# Patient Record
Sex: MALE | Race: WHITE | RURAL
[De-identification: names, ages, dates, MRNs, and addresses within clinical notes are randomized per-mention and may not be internally consistent; named-entity substitution may affect disease eponyms.]

---

## 2019-11-26 ENCOUNTER — OFFICE VISIT (OUTPATIENT)
Dept: FAMILY MEDICINE CLINIC | Age: 29
End: 2019-11-26

## 2019-11-26 ENCOUNTER — HOSPITAL ENCOUNTER (OUTPATIENT)
Dept: LAB | Age: 29
Discharge: HOME | End: 2019-11-26

## 2019-11-26 VITALS
OXYGEN SATURATION: 98 % | WEIGHT: 272 LBS | RESPIRATION RATE: 16 BRPM | DIASTOLIC BLOOD PRESSURE: 84 MMHG | HEIGHT: 72 IN | TEMPERATURE: 98.8 F | HEART RATE: 73 BPM | BODY MASS INDEX: 36.84 KG/M2 | SYSTOLIC BLOOD PRESSURE: 139 MMHG

## 2019-11-26 DIAGNOSIS — R06.83 SNORING: ICD-10-CM

## 2019-11-26 DIAGNOSIS — M10.9 GOUT, UNSPECIFIED CAUSE, UNSPECIFIED CHRONICITY, UNSPECIFIED SITE: ICD-10-CM

## 2019-11-26 DIAGNOSIS — M25.511 ACUTE PAIN OF RIGHT SHOULDER: ICD-10-CM

## 2019-11-26 DIAGNOSIS — E66.9 OBESITY (BMI 30-39.9): ICD-10-CM

## 2019-11-26 DIAGNOSIS — R03.0 ELEVATED BLOOD PRESSURE READING: ICD-10-CM

## 2019-11-26 DIAGNOSIS — Z76.89 ENCOUNTER TO ESTABLISH CARE: ICD-10-CM

## 2019-11-26 DIAGNOSIS — H92.01 RIGHT EAR PAIN: Primary | ICD-10-CM

## 2019-11-26 DIAGNOSIS — J30.89 ENVIRONMENTAL AND SEASONAL ALLERGIES: ICD-10-CM

## 2019-11-26 DIAGNOSIS — H65.191 ACUTE EFFUSION OF RIGHT EAR: ICD-10-CM

## 2019-11-26 LAB
ALBUMIN SERPL-MCNC: 4 G/DL (ref 3.5–5)
ALBUMIN/GLOB SERPL: 1.1 {RATIO} (ref 1.1–2.2)
ALP SERPL-CCNC: 76 U/L (ref 45–117)
ALT SERPL-CCNC: 38 U/L (ref 12–78)
ANION GAP SERPL CALC-SCNC: 5 MMOL/L (ref 5–15)
AST SERPL-CCNC: 20 U/L (ref 15–37)
BILIRUB SERPL-MCNC: 0.4 MG/DL (ref 0.2–1)
BUN SERPL-MCNC: 12 MG/DL (ref 6–20)
BUN/CREAT SERPL: 13 (ref 12–20)
CALCIUM SERPL-MCNC: 9.2 MG/DL (ref 8.5–10.1)
CHLORIDE SERPL-SCNC: 105 MMOL/L (ref 97–108)
CHOLEST SERPL-MCNC: 181 MG/DL
CO2 SERPL-SCNC: 28 MMOL/L (ref 21–32)
CREAT SERPL-MCNC: 0.9 MG/DL (ref 0.7–1.3)
GLOBULIN SER CALC-MCNC: 3.5 G/DL (ref 2–4)
GLUCOSE SERPL-MCNC: 101 MG/DL (ref 65–100)
HDLC SERPL-MCNC: 31 MG/DL
HDLC SERPL: 5.8 {RATIO} (ref 0–5)
LDLC SERPL CALC-MCNC: 121.2 MG/DL (ref 0–100)
LIPID PROFILE,FLP: ABNORMAL
POTASSIUM SERPL-SCNC: 4 MMOL/L (ref 3.5–5.1)
PROT SERPL-MCNC: 7.5 G/DL (ref 6.4–8.2)
SODIUM SERPL-SCNC: 138 MMOL/L (ref 136–145)
TRIGL SERPL-MCNC: 144 MG/DL (ref ?–150)
VLDLC SERPL CALC-MCNC: 28.8 MG/DL

## 2019-11-26 RX ORDER — MINERAL OIL
ENEMA (ML) RECTAL
COMMUNITY
End: 2019-12-12

## 2019-11-26 RX ORDER — FLUTICASONE PROPIONATE 50 MCG
2 SPRAY, SUSPENSION (ML) NASAL DAILY
COMMUNITY
End: 2019-12-12

## 2019-11-26 RX ORDER — IBUPROFEN 200 MG
TABLET ORAL
COMMUNITY

## 2019-11-26 NOTE — PATIENT INSTRUCTIONS
Neck Arthritis: Exercises Introduction Here are some examples of exercises for you to try. The exercises may be suggested for a condition or for rehabilitation. Start each exercise slowly. Ease off the exercises if you start to have pain. You will be told when to start these exercises and which ones will work best for you. How to do the exercises Neck stretches to the side 1. This stretch works best if you keep your shoulder down as you lean away from it. To help you remember to do this, start by relaxing your shoulders and lightly holding on to your thighs or your chair. 2. Tilt your head toward your shoulder and hold for 15 to 30 seconds. Let the weight of your head stretch your muscles. 3. Repeat 2 to 4 times toward each shoulder. Chin tuck 1. Lie on the floor with a rolled-up towel under your neck. Your head should be touching the floor. 2. Slowly bring your chin toward your chest. 
3. Hold for a count of 6, and then relax for up to 10 seconds. 4. Repeat 8 to 12 times. Active cervical rotation 1. Sit in a firm chair, or stand up straight. 2. Keeping your chin level, turn your head to the right, and hold for 15 to 30 seconds. 3. Turn your head to the left and hold for 15 to 30 seconds. 4. Repeat 2 to 4 times to each side. Shoulder blade squeeze 1. While standing, squeeze your shoulder blades together. 2. Do not raise your shoulders up as you are squeezing. 3. Hold for 6 seconds. 4. Repeat 8 to 12 times. Shoulder rolls 1. Sit comfortably with your feet shoulder-width apart. You can also do this exercise standing up. 2. Roll your shoulders up, then back, and then down in a smooth, circular motion. 3. Repeat 2 to 4 times. Follow-up care is a key part of your treatment and safety. Be sure to make and go to all appointments, and call your doctor if you are having problems. It's also a good idea to know your test results and keep a list of the medicines you take. Where can you learn more? Go to http://alejandra-jac.info/. Enter V139 in the search box to learn more about \"Neck Arthritis: Exercises. \" Current as of: June 26, 2019 Content Version: 12.2 © 9682-8377 [employer]. Care instructions adapted under license by [employer] (which disclaims liability or warranty for this information). If you have questions about a medical condition or this instruction, always ask your healthcare professional. [address] any warranty or liability for your use of this information. Pinched Nerve in the Neck: Care Instructions Your Care Instructions A pinched nerve in the neck happens when a vertebra or disc in the upper part of your spine is damaged. This damage can happen because of an injury. Or it can just happen with age. The changes caused by the damage may put pressure on a nearby nerve root, pinching it. This causes symptoms such as sharp pain in your neck, shoulder, arm, hand, or back. You may also have tingling or numbness. Sometimes it makes your arm weaker. The symptoms are usually worse when you turn your head or strain your neck. For many people, the symptoms get better over time and finally go away. Early treatment usually includes medicines for pain and swelling. Sometimes physical therapy and special exercises may help. Follow-up care is a key part of your treatment and safety. Be sure to make and go to all appointments, and call your doctor if you are having problems. It's also a good idea to know your test results and keep a list of the medicines you take. How can you care for yourself at home? · Be safe with medicines. Read and follow all instructions on the label. ? If the doctor gave you a prescription medicine for pain, take it as prescribed. ? If you are not taking a prescription pain medicine, ask your doctor if you can take an over-the-counter medicine. · Try using a heating pad on a low or medium setting for 15 to 20 minutes every 2 or 3 hours. Try a warm shower in place of one session with the heating pad. You can also buy single-use heat wraps that last up to 8 hours. · You can also try an ice pack for 10 to 15 minutes every 2 to 3 hours. There isn't strong evidence that either heat or ice will help. But you can try them to see if they help you. · Don't spend too long in one position. Take short breaks to move around and change positions. · Wear a seat belt and shoulder harness when you are in a car. · Sleep with a pillow under your head and neck that keeps your neck straight. · If you were given a neck brace (cervical collar) to limit neck motion, wear it as instructed for as many days as your doctor tells you to. Do not wear it longer than you were told to. Wearing a brace for too long can lead to neck stiffness and can weaken the neck muscles. · Follow your doctor's instructions for gentle neck-stretching exercises. · Do not smoke. Smoking can slow healing of your discs. If you need help quitting, talk to your doctor about stop-smoking programs and medicines. These can increase your chances of quitting for good. · Avoid strenuous work or exercise until your doctor says it is okay. When should you call for help? Call 911 anytime you think you may need emergency care. For example, call if: 
  · You are unable to move an arm or a leg at all.  
[facility] your doctor now or seek immediate medical care if: 
  · You have new or worse symptoms in your arms, legs, chest, belly, or buttocks. Symptoms may include: 
? Numbness or tingling. ? Weakness. ? Pain.  
  · You lose bladder or bowel control.  
 Watch closely for changes in your health, and be sure to contact your doctor if: 
  · You are not getting better as expected. Where can you learn more? Go to http://alejandra-jac.info/. Enter K153 in the search box to learn more about \"Pinched Nerve in the Neck: Care Instructions. \" Current as of: June 26, 2019 Content Version: 12.2 © 1563-8407 [employer]. Care instructions adapted under license by [employer] (which disclaims liability or warranty for this information). If you have questions about a medical condition or this instruction, always ask your healthcare professional. [place] [address] any warranty or liability for your use of this information.

## 2019-11-26 NOTE — PROGRESS NOTES
Progress Note    Patient: Enrique Garcia MRN: [id number]  SSN: [id number]    YOB: 1990  Age: 30 y.o. Sex: male        Chief Complaint   Patient presents with    New Patient    Ear Pain     right ear pain, jaw pain, throat pain several months    Shoulder Pain         Subjective:     Problems addressed:  Encounter Diagnoses     ICD-10-CM ICD-9-CM   1. Right ear pain H92.01 388.70   2. Acute effusion of right ear H65.191 381.00   3. Environmental and seasonal allergies J30.89 477.8   4. Acute pain of right shoulder M25.511 719.41   5. Obesity (BMI 30-39. 9) E66.9 278.00   6. Snoring R06.83 786.09   7. Gout, unspecified cause, unspecified chronicity, unspecified site M10.9 274.9   8. Encounter to establish care Z76.89 V65.8   9. Elevated blood pressure reading R03.0 796.2       31 y/o M with PMH of gout and obesity presents to establish care and R ear pain. Previously went to [facility], will request and review records, pt says [facility] is close for him. Patient complains of R ear pain, R jaw pain, and R shoulder pain. R ear pain started a few months ago, was treated for ear infection with Zithromax and amoxicillin by prior PCP, symptoms improved but still with R ear pain, nasal congestion, and feeling of fullness. Denies decreased hearing. R jaw pain occurred after \"popping\" jaw 3 weeks ago and has gotten a little better since then, but still bothers him, \"Initially I could not chew for a little bit\". No similar episode in the past. 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 Occasionally uses ibuprofen for pain but otherwise takes no medications. Denies any fever, chills, CP, SOB, Abdominal pain, N/V, diarrhea or constipation.      Pt says his grandmother was recently told she had kidney cancer, but then had testing that showed spot was not cancerous. Says that made him want to come to clinic to take care of his health. Complains of snoring at night and would like a sleep study referral.     Gout: Pt with hx of gout attacks in the past, but not currently on medications, no gout attacks in past year. Gout well controlled with diet changes (pt cut out alcohol and red meat from his diet)    Family History   Problem Relation Age of Onset    Heart Attack Father         Early 45s    Hypertension Father     COPD Mother      Social History     Tobacco Use    Smoking status: Never Smoker    Smokeless tobacco: Never Used   Substance Use Topics    Alcohol use: Not Currently    Drug use: Never         Current and past medical information:    Current Medications after this visit[de-identified]     Current Outpatient Medications   Medication Sig    fluticasone propionate (FLONASE ALLERGY RELIEF) 50 mcg/actuation nasal spray 2 Sprays by Both Nostrils route daily.  fexofenadine (ALLEGRA) 180 mg tablet Take  by mouth.  ibuprofen (MOTRIN) 200 mg tablet Take  by mouth. No current facility-administered medications for this visit. Patient Active Problem List    Diagnosis Date Noted    Obesity (BMI 30-39.9) 11/26/2019    Gout        Past Medical History:   Diagnosis Date    Gout        No Known Allergies    Past Surgical History:   Procedure Laterality Date    HX ORTHOPAEDIC           Review of Systems   Constitutional: Negative for chills and fever. HENT: Positive for congestion and ear pain (R ear). Negative for sore throat. Eyes: Negative for blurred vision and double vision. Respiratory: Negative for cough and shortness of breath. Cardiovascular: Negative for chest pain and palpitations. Gastrointestinal: Negative for abdominal pain, blood in stool, nausea and vomiting. Genitourinary: Negative for hematuria. Musculoskeletal: Positive for joint pain (R shoulder pain). Negative for falls. Skin: Negative for rash.    Neurological: Negative for dizziness and headaches. Psychiatric/Behavioral: Negative for substance abuse. Objective:     Vitals:    11/26/19 0822   BP: 139/84   Pulse: 73   Resp: 16   Temp: 98.8 °F (37.1 °C)   TempSrc: Oral   SpO2: 98%   Weight: 272 lb (123.4 kg)   Height: 6' (1.829 m)      Body mass index is 36.89 kg/m². Physical Exam  Vitals signs reviewed. Constitutional:       General: He is not in acute distress. Appearance: Normal appearance. He is obese. He is not ill-appearing, toxic-appearing or diaphoretic. HENT:      Head: Normocephalic and atraumatic. Right Ear: Ear canal and external ear normal. A middle ear effusion is present. There is no impacted cerumen. Tympanic membrane is not erythematous. Left Ear: Ear canal and external ear normal.  No middle ear effusion. There is no impacted cerumen. Tympanic membrane is not erythematous. Nose: Congestion present. Mouth/Throat:      Mouth: Mucous membranes are moist.      Pharynx: No oropharyngeal exudate. Eyes:      Extraocular Movements: Extraocular movements intact. Conjunctiva/sclera: Conjunctivae normal.      Pupils: Pupils are equal, round, and reactive to light. Neck:      Musculoskeletal: Normal range of motion and neck supple. Cardiovascular:      Rate and Rhythm: Normal rate and regular rhythm. Heart sounds: No murmur. Pulmonary:      Effort: Pulmonary effort is normal. No respiratory distress. Breath sounds: Normal breath sounds. No wheezing or rhonchi. Abdominal:      General: Bowel sounds are normal.      Palpations: Abdomen is soft. Tenderness: There is no tenderness. There is no guarding. Musculoskeletal:      Right shoulder: He exhibits normal range of motion, no swelling, no deformity and normal strength. Comments: Positive Spurling test on R side with pain shooting down to pt's shoulder   Lymphadenopathy:      Cervical: No cervical adenopathy. Skin:     General: Skin is warm and dry. Coloration: Skin is not pale. Findings: No erythema. Neurological:      General: No focal deficit present. Mental Status: He is alert. Psychiatric:      Comments: Anxious Affect          Health Maintenance Due   Topic Date Due    DTaP/Tdap/Td series (1 - Tdap) 10/14/2002    Influenza Age 5 to Adult  08/01/2019       Assessment and orders:     Encounter Diagnoses     ICD-10-CM ICD-9-CM   1. Right ear pain H92.01 388.70   2. Acute effusion of right ear H65.191 381.00   3. Environmental and seasonal allergies J30.89 477.8   4. Acute pain of right shoulder M25.511 719.41   5. Obesity (BMI 30-39. 9) E66.9 278.00   6. Snoring R06.83 786.09   7. Gout, unspecified cause, unspecified chronicity, unspecified site M10.9 274.9   8. Encounter to establish care Z76.89 V65.8   9. Elevated blood pressure reading R03.0 796.2       29 y/o M with R ear effusion, likely 2/2 to allergies, and R shoulder pain with possible cervical radiculopathy    1. Right ear pain with effusion - no ear infection, likely 2/2 to effusion from congestion  -START allegra daily  -START Flonase daily  -RTC in 2 weeks for reevaluation or sooner if symptoms do not improve or worsen    2. Environmental and seasonal allergies - likely cause of congestion  -START allegra daily  -START Flonase daily    3. Acute pain of right shoulder - possibly due to overuse at work vs cervical radiculopathy, no hx of trauma, injury, or prior surgery, no concerning signs on PE, full ROM and no loss of strength with minimal discomfort  -Pt given handout on neck exercises to try  -Continue Ibuprofen PRN and ice and/or heating pads as needed  -Will plan for xray if symptoms do not improve with conservative measures     4. Obesity (BMI 30-39.9) - Body mass index is 36.89 kg/m². - Counseled on dietary modifications (soda is a problem)  - METABOLIC PANEL, COMPREHENSIVE; Future  - LIPID PANEL;  Future  - Pt will consider doing weight loss clinic visit and let me know at next apt in 2 weeks    5. Snoring - pt reports he snores a lot at night (per his wife) and he feels fatigued throughout the day  - SLEEP MEDICINE REFERRAL    6. Gout, unspecified cause, unspecified chronicity, unspecified site - well controlled with dietary modifications, not on any medications for gout  -Will continue to monitor    7. Encounter to establish care  - Will request and review prior PCP records   - METABOLIC PANEL, COMPREHENSIVE; Future  - LIPID PANEL; Future    8. Elevated BP - pt denies any history of HTN  BP Readings from Last 3 Encounters:   11/26/19 139/84   -Will continue to monitor  -Encouraging pt to lose weight       Plan of care:  Discussed diagnoses in detail with patient. Medication risks/benefits/side effects discussed with patient. All of the patient's questions were addressed. The patient understands and agrees with our plan of care. The patient knows to call back if they are unsure of or forget any changes we discussed today or if the symptoms change. The patient received an After-Visit Summary which contains VS, orders, medication list and allergy list. This can be used as a \"mini-medical record\" should they have to seek medical care while out of town. Follow-up and Dispositions    · Return in about 2 weeks (around 12/10/2019), or if symptoms worsen or fail to improve, for Lab Results, ear pain, shoulder pain.          Future Appointments   Date Time Provider Alexander Martinez   12/12/2019 10:40 AM Imelda Reyes MD [facility] SCHED       Signed By: Jyothi Huston MD     November 26, 2019

## 2019-11-26 NOTE — PROGRESS NOTES
1. Have you been to the ER, urgent care clinic, or been hospitalized since your last visit? No     2. Have you seen or consulted any other health care providers outside of the [address] [place] [address] since your last visit?   no    Reviewed record in preparation for visit and have necessary documentation  Goals that were addressed and/or need to be completed during or after this appointment include   Health Maintenance Due   Topic Date Due    DTaP/Tdap/Td series (1 - Tdap) 10/14/2002    Influenza Age 5 to Adult  08/01/2019       I have received verbal consent from Yin Ward to discuss any/all medical information while others present in the room.

## 2019-11-29 NOTE — PROGRESS NOTES
Results noted. Glucose 101, . Encouraging weight loss, exercise, and healthy diet. Will continue to monitor.      Latrell Lynch MD  PGY2 Family Medicine Resident

## 2019-12-12 ENCOUNTER — OFFICE VISIT (OUTPATIENT)
Dept: FAMILY MEDICINE CLINIC | Age: 29
End: 2019-12-12

## 2019-12-12 VITALS
SYSTOLIC BLOOD PRESSURE: 121 MMHG | DIASTOLIC BLOOD PRESSURE: 73 MMHG | HEIGHT: 72 IN | RESPIRATION RATE: 16 BRPM | WEIGHT: 272 LBS | HEART RATE: 88 BPM | TEMPERATURE: 97.7 F | OXYGEN SATURATION: 96 % | BODY MASS INDEX: 36.84 KG/M2

## 2019-12-12 DIAGNOSIS — H65.191 ACUTE EFFUSION OF RIGHT EAR: ICD-10-CM

## 2019-12-12 DIAGNOSIS — M25.511 ACUTE PAIN OF RIGHT SHOULDER: ICD-10-CM

## 2019-12-12 DIAGNOSIS — H92.01 RIGHT EAR PAIN: Primary | ICD-10-CM

## 2019-12-12 DIAGNOSIS — K30 INDIGESTION: ICD-10-CM

## 2019-12-12 RX ORDER — AZITHROMYCIN 250 MG/1
TABLET, FILM COATED ORAL
Qty: 6 TAB | Refills: 0 | Status: SHIPPED | OUTPATIENT
Start: 2019-12-12

## 2019-12-12 NOTE — PROGRESS NOTES
I discussed the findings, assessment and plan in detail with the resident and agree with the resident's findings and plan as documented in the resident's note. Zulema Mai M.D.

## 2019-12-12 NOTE — PATIENT INSTRUCTIONS
Middle Ear Fluid: Care Instructions Your Care Instructions Fluid often builds up inside the ear during a cold or allergies. Usually the fluid drains away, but sometimes a small tube in the ear, called the eustachian tube, stays blocked for months. Symptoms of fluid buildup may include: · Popping, ringing, or a feeling of fullness or pressure in the ear. · Trouble hearing. · Balance problems and dizziness. In most cases, you can treat yourself at home. Follow-up care is a key part of your treatment and safety. Be sure to make and go to all appointments, and call your doctor if you are having problems. It's also a good idea to know your test results and keep a list of the medicines you take. How can you care for yourself at home? · In most cases, the fluid clears up within a few months without treatment. You may need more tests if the fluid does not clear up after 3 months. · If your doctor prescribed antibiotics, take them as directed. Do not stop taking them just because you feel better. You need to take the full course of antibiotics. When should you call for help? Call your doctor now or seek immediate medical care if: 
  · You have symptoms of infection, such as: 
? Increased pain, swelling, warmth, or redness. ? Pus draining from the area. ? A fever.  
 Watch closely for changes in your health, and be sure to contact your doctor if: 
  · You notice changes in hearing.  
  · You do not get better as expected. Where can you learn more? Go to http://alejandra-jac.info/. Enter J158 in the search box to learn more about \"Middle Ear Fluid: Care Instructions. \" Current as of: October 21, 2018 Content Version: 12.2 © 0259-2555 [employer]. Care instructions adapted under license by [employer] (which disclaims liability or warranty for this information).  If you have questions about a medical condition or this instruction, always ask your healthcare professional. [place] [address] any warranty or liability for your use of this information.

## 2019-12-12 NOTE — PROGRESS NOTES
1. Have you been to the ER, urgent care clinic since your last visit? Hospitalized since your last visit? No    2. Have you seen or consulted any other health care providers outside of the [address] [place] [address] since your last visit? Include any pap smears or colon screening.  No  Reviewed record in preparation for visit and have necessary documentation  Pt did not bring medication to office visit for review    Goals that were addressed and/or need to be completed during or after this appointment include   Health Maintenance Due   Topic Date Due    DTaP/Tdap/Td series (1 - Tdap) 10/14/2002    Influenza Age 5 to Adult  08/01/2019     Pt declines MyChart at this time

## 2019-12-12 NOTE — PROGRESS NOTES
Progress Note    Patient: Chriss Medeiros MRN: [id number]  SSN: [id number]    YOB: 1990  Age: 30 y.o. Sex: male        Chief Complaint   Patient presents with    Ear Pain         Subjective:     Problems addressed:  Encounter Diagnoses     ICD-10-CM ICD-9-CM   1. Right ear pain H92.01 388.70   2. Indigestion K30 536.8   3. Acute effusion of right ear H65.191 381.00   4. Acute pain of right shoulder M25.511 719.41       HPI: 29 y/o M presents for f/u for shoulder pain and R ear pain. Since last clinic visit 2 weeks ago, shoulder pain is better with ibuprofen and exercises, but pt complaining still having R ear pain. 4/10 intensity. \"Monday, my ear/R jaw was hurting so bad I almost went to the ER\". Allegra and flonase not helping with ear pain or congestion, stopped taking after 1 week. 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 Pt also complaining of indigestion, worse with milk. Dairy products cause congestion and stomach ache. Takes tums which help a little. Denies any fever, chills, CP, SOB, N/V. Current and past medical information:    Current Medications after this visit[de-identified]     Current Outpatient Medications   Medication Sig    azithromycin (ZITHROMAX) 250 mg tablet Take 2 tablets today, then take 1 tablet daily    ibuprofen (MOTRIN) 200 mg tablet Take  by mouth. No current facility-administered medications for this visit. Patient Active Problem List    Diagnosis Date Noted    Obesity (BMI 30-39.9) 11/26/2019    Gout        Past Medical History:   Diagnosis Date    Gout        No Known Allergies    Past Surgical History:   Procedure Laterality Date    HX ORTHOPAEDIC         Social History     Tobacco Use    Smoking status: Never Smoker    Smokeless tobacco: Never Used   Substance Use Topics    Alcohol use: Not Currently    Drug use: Never         Review of Systems   Constitutional: Negative for chills and fever. HENT: Positive for ear pain (R ear).  Negative for sore throat. Respiratory: Negative for cough and shortness of breath. Gastrointestinal: Positive for heartburn. Objective:     Vitals:    12/12/19 1039   BP: 121/73   Pulse: 88   Resp: 16   Temp: 97.7 °F (36.5 °C)   TempSrc: Oral   SpO2: 96%   Weight: 272 lb (123.4 kg)   Height: 6' (1.829 m)      Body mass index is 36.89 kg/m². Physical Exam  Vitals signs reviewed. Constitutional:       General: He is not in acute distress. Appearance: Normal appearance. He is obese. He is not diaphoretic. HENT:      Head: Normocephalic and atraumatic. Right Ear: Tympanic membrane, ear canal and external ear normal.      Left Ear: Tympanic membrane, ear canal and external ear normal.      Ears:      Comments: R middle ear effusion     Nose: Congestion present. Mouth/Throat:      Mouth: Mucous membranes are moist.      Pharynx: Oropharynx is clear. No oropharyngeal exudate. Eyes:      Conjunctiva/sclera: Conjunctivae normal.      Pupils: Pupils are equal, round, and reactive to light. Neck:      Musculoskeletal: Neck supple. Cardiovascular:      Rate and Rhythm: Normal rate and regular rhythm. Heart sounds: No murmur. Pulmonary:      Effort: Pulmonary effort is normal. No respiratory distress. Breath sounds: No wheezing or rhonchi. Lymphadenopathy:      Cervical: No cervical adenopathy. Skin:     General: Skin is warm and dry. Neurological:      General: No focal deficit present. Mental Status: He is alert. Psychiatric:         Mood and Affect: Mood normal.         Behavior: Behavior normal.          Health Maintenance Due   Topic Date Due    DTaP/Tdap/Td series (1 - Tdap) 10/14/2002       Assessment and orders:     Encounter Diagnoses     ICD-10-CM ICD-9-CM   1. Right ear pain H92.01 388.70   2. Indigestion K30 536.8   3. Acute effusion of right ear H65.191 381.00   4.  Acute pain of right shoulder M25.511 719.41       29 y/o M with improved shoulder pain and ear pain which has not improved with continued R ear effusion    1. Right ear pain - likely 2/2 to continued ear effusion, not improved with 1 week of flonase and allegra   -START azithromycin (ZITHROMAX) 250 mg tablet; Take 2 tablets today, then take 1 tablet daily  Dispense: 6 Tab; Refill: 0  - REFERRAL TO ENT-OTOLARYNGOLOGY    2. Indigestion - improved with tums, worsened with milk  -Avoid dairy products  -Start prilosec otc    3. 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 Medication risks/benefits/side effects discussed with patient. All of the patient's questions were addressed. The patient understands and agrees with our plan of care. The patient knows to call back if they are unsure of or forget any changes we discussed today or if the symptoms change. The patient received an After-Visit Summary which contains VS, orders, medication list and allergy list. This can be used as a \"mini-medical record\" should they have to seek medical care while out of town. Follow-up and Dispositions    · Return if symptoms worsen or fail to improve. No future appointments.     Signed By: Jyothi Huston MD     December 12, 2019